# Patient Record
Sex: MALE | Race: WHITE | NOT HISPANIC OR LATINO
[De-identification: names, ages, dates, MRNs, and addresses within clinical notes are randomized per-mention and may not be internally consistent; named-entity substitution may affect disease eponyms.]

---

## 2023-03-30 ENCOUNTER — APPOINTMENT (OUTPATIENT)
Dept: ORTHOPEDIC SURGERY | Facility: CLINIC | Age: 33
End: 2023-03-30
Payer: OTHER GOVERNMENT

## 2023-03-30 DIAGNOSIS — S66.921A: ICD-10-CM

## 2023-03-30 DIAGNOSIS — Z00.00 ENCOUNTER FOR GENERAL ADULT MEDICAL EXAMINATION W/OUT ABNORMAL FINDINGS: ICD-10-CM

## 2023-03-30 PROCEDURE — 99203 OFFICE O/P NEW LOW 30 MIN: CPT

## 2023-03-30 PROCEDURE — 73130 X-RAY EXAM OF HAND: CPT | Mod: RT

## 2023-03-30 NOTE — HISTORY OF PRESENT ILLNESS
[5] : 5 [de-identified] : 33yo RHD M here for right hand evaluation. He notes that on 7/5/2020 while serving in the  he sustained a laceration to the dorsum of the right ring finger at the level of the MCP. He was treated operatively at the [facility] in [facility] where his tendon laceration was repaired. He subsequently completed physical therapy and has regained full unrestricted and painless use of the right hand. At this time the patient is pursuing a career with the [employer] and is requesting documentation of his right hand function. At this time he continues to work as a  officer and is able to handle his firearm and perform all tasks without difficulty. He denies change in sensation. [FreeTextEntry5] : 03/30/2023  SULMA 32 year M is here for Right Hand. states (07/2020) he he had surgery and needs paper work done stating he is able to use his hand in full use for the [employer].

## 2023-03-30 NOTE — DATA REVIEWED
[FreeTextEntry1] : 3 views of the right hand were obtained in the office today and independently evaluated without evidence of fracture or malalignment.

## 2023-03-30 NOTE — IMAGING
[de-identified] : Right hand\par Well healed surgical wound at the dorsum of the ring finger MCP joint\par Normal muscle tone/ bulk\par Full painless finger ROM\par Able to make a composite fist\par 5/5 strength in EDC to ring finger\par +AIN/ PIN/ UIlnar n\par SILT throughout\par fingers wwp\par